# Patient Record
Sex: MALE | Race: WHITE | NOT HISPANIC OR LATINO | URBAN - METROPOLITAN AREA
[De-identification: names, ages, dates, MRNs, and addresses within clinical notes are randomized per-mention and may not be internally consistent; named-entity substitution may affect disease eponyms.]

---

## 2017-01-31 ENCOUNTER — OFFICE (OUTPATIENT)
Dept: URBAN - METROPOLITAN AREA CLINIC 78 | Facility: CLINIC | Age: 72
End: 2017-01-31

## 2017-01-31 VITALS
HEIGHT: 66 IN | TEMPERATURE: 98.6 F | DIASTOLIC BLOOD PRESSURE: 77 MMHG | SYSTOLIC BLOOD PRESSURE: 134 MMHG | WEIGHT: 174 LBS | HEART RATE: 74 BPM

## 2017-01-31 DIAGNOSIS — Z86.010 PERSONAL HISTORY OF COLONIC POLYPS: ICD-10-CM

## 2017-01-31 DIAGNOSIS — Z95.818 PRESENCE OF OTHER CARDIAC IMPLANTS AND GRAFTS: ICD-10-CM

## 2017-01-31 DIAGNOSIS — I71.4 ABDOMINAL AORTIC ANEURYSM, WITHOUT RUPTURE: ICD-10-CM

## 2017-01-31 PROCEDURE — 00031: CPT

## 2017-01-31 PROCEDURE — 99204 OFFICE O/P NEW MOD 45 MIN: CPT

## 2017-01-31 NOTE — SERVICEHPINOTES
Mr. Ruvalcaba is here to discuss a colonoscopy. His last one in 2012 showed severe lymphocytic colitis given a 5 yr recall. Per old records, previous hx of colon polyps. He denies constipation, diarrhea, abdominal pain, weight loss, chest pain and SOB. No family hx of colon cancer. He has a [employer] device in place to see if any arrhythmia but none detected. Unsure if he has any other cardiac issues but per patient, no known heart issues. In June of 2015 he had DVT, not on any blood thinner. He is having a bladder stimulator placed soon for urinary issues. He has a AAA with a size of 3.0 x 2.8 cm. This is checked annually. No other GI related complaints today.

## 2017-03-15 ENCOUNTER — ON CAMPUS - OUTPATIENT (OUTPATIENT)
Dept: URBAN - METROPOLITAN AREA HOSPITAL 63 | Facility: HOSPITAL | Age: 72
End: 2017-03-15
Payer: COMMERCIAL

## 2017-03-15 DIAGNOSIS — K57.30 DIVERTICULOSIS OF LARGE INTESTINE WITHOUT PERFORATION OR ABS: ICD-10-CM

## 2017-03-15 DIAGNOSIS — K63.5 POLYP OF COLON: ICD-10-CM

## 2017-03-15 DIAGNOSIS — D12.2 BENIGN NEOPLASM OF ASCENDING COLON: ICD-10-CM

## 2017-03-15 DIAGNOSIS — D12.3 BENIGN NEOPLASM OF TRANSVERSE COLON: ICD-10-CM

## 2017-03-15 DIAGNOSIS — Z86.010 PERSONAL HISTORY OF COLONIC POLYPS: ICD-10-CM

## 2017-03-15 PROCEDURE — 45380 COLONOSCOPY AND BIOPSY: CPT | Mod: PT

## 2017-07-20 ENCOUNTER — OFFICE (OUTPATIENT)
Dept: URBAN - METROPOLITAN AREA CLINIC 78 | Facility: CLINIC | Age: 72
End: 2017-07-20

## 2017-07-20 VITALS
HEART RATE: 80 BPM | TEMPERATURE: 97.6 F | SYSTOLIC BLOOD PRESSURE: 135 MMHG | HEIGHT: 66 IN | WEIGHT: 170 LBS | DIASTOLIC BLOOD PRESSURE: 75 MMHG

## 2017-07-20 DIAGNOSIS — K59.1 FUNCTIONAL DIARRHEA: ICD-10-CM

## 2017-07-20 PROCEDURE — 99214 OFFICE O/P EST MOD 30 MIN: CPT

## 2017-07-20 NOTE — SERVICEHPINOTES
72 yo male with h/o microscopic colitis in 2012 presents with diarrhea. He had trigger finger surgery in May and was on abx for a few days, after which he started having diarrhea up to 8x/day. Was started on probiotics. Symptoms have improved somewhat. He had negative stool testing for culture, C diff, Giardia ag, O&ampPx1, and unremarkable CBC, CMP, CRP, ESR. He reports a normal formed BM in the morning but progressively gets looser as day goes on. Denies liquid stools, just soft/loose. Still having 4+ BMs per day. Denies blood in stools. Occasional crampy pain and urgency. Denies NSAID use. Last colonoscopy was in March - benign findings but had adenomatous polyps with 5-year recall advised.Denies h/o pancreatitis or ETOH use. Was a smoker until 1982. Has pre-diabetes.  He had presented with diarrhea in early 2012 and colonoscopy then revealed lymphocytic colitis. He had diarrhea 5-7x/day which quickly resolved on Asacol HD 800mg 3x/day at first and then one pill qod with stopping the med in 2013 without recurrence. BR

## 2018-09-26 ENCOUNTER — OFFICE (OUTPATIENT)
Dept: URBAN - METROPOLITAN AREA CLINIC 33 | Facility: CLINIC | Age: 73
End: 2018-09-26

## 2018-09-26 VITALS
HEIGHT: 66 IN | TEMPERATURE: 97.7 F | HEART RATE: 70 BPM | SYSTOLIC BLOOD PRESSURE: 130 MMHG | WEIGHT: 164 LBS | DIASTOLIC BLOOD PRESSURE: 71 MMHG

## 2018-09-26 DIAGNOSIS — K52.89 OTHER SPECIFIED NONINFECTIVE GASTROENTERITIS AND COLITIS: ICD-10-CM

## 2018-09-26 DIAGNOSIS — R19.7 DIARRHEA, UNSPECIFIED: ICD-10-CM

## 2018-09-26 PROCEDURE — 99214 OFFICE O/P EST MOD 30 MIN: CPT

## 2018-10-01 LAB — OVA AND PARASITES, CONC/PERM SMEAR, 3 SPEC: (no result)

## 2018-10-02 LAB
CLOSTRIDIUM DIFFICILE TOXIN/GDH W/REFL TO PCR: (no result)
FECAL LEUKOCYTE STAIN: (no result)
GIARDIA AG, EIA, STOOL: (no result)
SAL/SHIG/CAMPY, CULTURE AND SHIGA TOXIN, EIA W/RFL TO E.COLI, CULTURE: CAMPYLOBACTER, CULTURE: (no result)
SAL/SHIG/CAMPY, CULTURE AND SHIGA TOXIN, EIA W/RFL TO E.COLI, CULTURE: SALMONELLA AND SHIGELLA, CULTURE: (no result)
SAL/SHIG/CAMPY, CULTURE AND SHIGA TOXIN, EIA W/RFL TO E.COLI, CULTURE: SHIGA TOXINS, EIA W/RFL TO E.COLI O157 CULTURE: (no result)

## 2018-12-10 ENCOUNTER — AMBULATORY SURGICAL CENTER (OUTPATIENT)
Dept: URBAN - METROPOLITAN AREA SURGERY 21 | Facility: SURGERY | Age: 73
End: 2018-12-10
Payer: COMMERCIAL

## 2018-12-10 DIAGNOSIS — R19.7 DIARRHEA, UNSPECIFIED: ICD-10-CM

## 2018-12-10 PROCEDURE — 45330 DIAGNOSTIC SIGMOIDOSCOPY: CPT

## 2019-02-14 ENCOUNTER — OFFICE (OUTPATIENT)
Dept: URBAN - METROPOLITAN AREA CLINIC 78 | Facility: CLINIC | Age: 74
End: 2019-02-14

## 2019-02-14 VITALS
SYSTOLIC BLOOD PRESSURE: 134 MMHG | HEART RATE: 73 BPM | TEMPERATURE: 98 F | HEIGHT: 66 IN | WEIGHT: 168 LBS | DIASTOLIC BLOOD PRESSURE: 78 MMHG

## 2019-02-14 DIAGNOSIS — K52.89 OTHER SPECIFIED NONINFECTIVE GASTROENTERITIS AND COLITIS: ICD-10-CM

## 2019-02-14 DIAGNOSIS — R14.0 ABDOMINAL DISTENSION (GASEOUS): ICD-10-CM

## 2019-02-14 DIAGNOSIS — K64.1 SECOND DEGREE HEMORRHOIDS: ICD-10-CM

## 2019-02-14 PROCEDURE — 99213 OFFICE O/P EST LOW 20 MIN: CPT

## 2019-02-14 RX ORDER — BUDESONIDE 9 MG/1
TABLET, EXTENDED RELEASE ORAL
Qty: 14 | Refills: 0 | Status: COMPLETED
Start: 2019-02-14 | End: 2019-12-16

## 2019-02-20 ENCOUNTER — OFFICE (OUTPATIENT)
Dept: URBAN - METROPOLITAN AREA CLINIC 78 | Facility: CLINIC | Age: 74
End: 2019-02-20

## 2019-02-20 VITALS
TEMPERATURE: 98.2 F | HEART RATE: 76 BPM | HEIGHT: 66 IN | WEIGHT: 166 LBS | SYSTOLIC BLOOD PRESSURE: 155 MMHG | DIASTOLIC BLOOD PRESSURE: 71 MMHG

## 2019-02-20 DIAGNOSIS — K64.2 THIRD DEGREE HEMORRHOIDS: ICD-10-CM

## 2019-02-20 DIAGNOSIS — K62.5 HEMORRHAGE OF ANUS AND RECTUM: ICD-10-CM

## 2019-02-20 PROCEDURE — 46221 LIGATION OF HEMORRHOID(S): CPT

## 2019-03-04 ENCOUNTER — OFFICE (OUTPATIENT)
Dept: URBAN - METROPOLITAN AREA CLINIC 33 | Facility: CLINIC | Age: 74
End: 2019-03-04

## 2019-03-04 VITALS
HEART RATE: 61 BPM | SYSTOLIC BLOOD PRESSURE: 161 MMHG | WEIGHT: 165 LBS | DIASTOLIC BLOOD PRESSURE: 83 MMHG | HEIGHT: 66 IN | TEMPERATURE: 97.7 F

## 2019-03-04 DIAGNOSIS — K62.5 HEMORRHAGE OF ANUS AND RECTUM: ICD-10-CM

## 2019-03-04 DIAGNOSIS — K62.89 OTHER SPECIFIED DISEASES OF ANUS AND RECTUM: ICD-10-CM

## 2019-03-04 DIAGNOSIS — K64.1 SECOND DEGREE HEMORRHOIDS: ICD-10-CM

## 2019-03-04 PROCEDURE — 46221 LIGATION OF HEMORRHOID(S): CPT

## 2019-03-25 ENCOUNTER — OFFICE (OUTPATIENT)
Dept: URBAN - METROPOLITAN AREA CLINIC 33 | Facility: CLINIC | Age: 74
End: 2019-03-25

## 2019-03-25 VITALS
WEIGHT: 167 LBS | HEART RATE: 68 BPM | TEMPERATURE: 97.7 F | SYSTOLIC BLOOD PRESSURE: 145 MMHG | HEIGHT: 66 IN | DIASTOLIC BLOOD PRESSURE: 78 MMHG

## 2019-03-25 DIAGNOSIS — K62.5 HEMORRHAGE OF ANUS AND RECTUM: ICD-10-CM

## 2019-03-25 DIAGNOSIS — K64.1 SECOND DEGREE HEMORRHOIDS: ICD-10-CM

## 2019-03-25 PROCEDURE — 46221 LIGATION OF HEMORRHOID(S): CPT

## 2019-12-16 ENCOUNTER — OFFICE (OUTPATIENT)
Dept: URBAN - METROPOLITAN AREA CLINIC 78 | Facility: CLINIC | Age: 74
End: 2019-12-16

## 2019-12-16 VITALS
WEIGHT: 163 LBS | TEMPERATURE: 97.7 F | HEART RATE: 68 BPM | HEIGHT: 66 IN | SYSTOLIC BLOOD PRESSURE: 157 MMHG | DIASTOLIC BLOOD PRESSURE: 80 MMHG

## 2019-12-16 DIAGNOSIS — K52.839 MICROSCOPIC COLITIS, UNSPECIFIED: ICD-10-CM

## 2019-12-16 DIAGNOSIS — K59.1 FUNCTIONAL DIARRHEA: ICD-10-CM

## 2019-12-16 PROCEDURE — 99214 OFFICE O/P EST MOD 30 MIN: CPT

## 2019-12-16 NOTE — SERVICEHPINOTES
Mr. Ruvalcaba is a 74 yr old male here for f/u regarding diarrhea. He has been following with Dr. Quijano who dx him last year with microscopic colitis stool studies were negative. According to chart review, he was also dx with lymphocytic colitis upon colonoscopy in 2012. He was given Uceris in February 2019 for 14 days. He denies chest pain, SOB, constipation, ab pain, reflux, rectal bleeding, and weight loss. He can have accidents and go up to 10 times per day. He is now doing Imodium one pill a day now down to 3 BMs per day. No blood in his stool and some gradual weight loss (he attributes this to diet and exercise). No known kidney disease. Has experienced nocturnal stools but not currently. No regular NSAID use. GFR is 67.